# Patient Record
Sex: MALE | Race: WHITE | NOT HISPANIC OR LATINO
[De-identification: names, ages, dates, MRNs, and addresses within clinical notes are randomized per-mention and may not be internally consistent; named-entity substitution may affect disease eponyms.]

---

## 2021-09-23 DIAGNOSIS — Z01.818 ENCOUNTER FOR OTHER PREPROCEDURAL EXAMINATION: ICD-10-CM

## 2021-09-23 PROBLEM — Z00.00 ENCOUNTER FOR PREVENTIVE HEALTH EXAMINATION: Status: ACTIVE | Noted: 2021-09-23

## 2021-09-24 ENCOUNTER — APPOINTMENT (OUTPATIENT)
Dept: DISASTER EMERGENCY | Facility: CLINIC | Age: 41
End: 2021-09-24

## 2021-09-25 LAB — SARS-COV-2 N GENE NPH QL NAA+PROBE: NOT DETECTED

## 2022-08-31 ENCOUNTER — TRANSCRIPTION ENCOUNTER (OUTPATIENT)
Age: 42
End: 2022-08-31

## 2022-08-31 ENCOUNTER — EMERGENCY (EMERGENCY)
Facility: HOSPITAL | Age: 42
LOS: 1 days | Discharge: HOME | End: 2022-08-31
Admitting: STUDENT IN AN ORGANIZED HEALTH CARE EDUCATION/TRAINING PROGRAM
Payer: COMMERCIAL

## 2022-08-31 VITALS
OXYGEN SATURATION: 95 % | DIASTOLIC BLOOD PRESSURE: 88 MMHG | TEMPERATURE: 98 F | RESPIRATION RATE: 18 BRPM | HEART RATE: 70 BPM | HEIGHT: 69 IN | WEIGHT: 184.97 LBS | SYSTOLIC BLOOD PRESSURE: 132 MMHG

## 2022-08-31 PROCEDURE — 99283 EMERGENCY DEPT VISIT LOW MDM: CPT

## 2022-08-31 PROCEDURE — 99282 EMERGENCY DEPT VISIT SF MDM: CPT

## 2022-08-31 NOTE — ED PROVIDER NOTE - PHYSICAL EXAMINATION
CONSTITUTIONAL: Awake, alert.  Nontoxic, no acute distress.    HEAD: Normocephalic, atraumatic.    EYES: Conjunctivae clear without exudates or hemorrhage. Sclera is non-icteric.    ENT: Normal appearing external ears, nose, mucous membranes moist.  No lesions in mouth.    NECK: supple, trachea midline.    HEART:  Normal rate, regular rhythm.  Heart sounds S1, S2.  No murmurs, rubs or gallops.    LUNGS:  No acute respiratory distress.  Non-tachypneic and non-labored.  Lungs are clear bilaterally with good aeration.  No wheezing, rales, rhonchi.    ABDOMEN: No ttp    MUSCULOSKELETAL:  Moving all extremities without issue.    SKIN: +scattered pink, blanching/ urticarial rash to b/l lower ext, lower abd/lower back, and b/l upper inner arms     NEUROLOGICAL:  Patient is alert, oriented x person, place and time.    PSYCH: Appropriate mood and affect. Good judgment and insight.

## 2022-08-31 NOTE — ED PROVIDER NOTE - PATIENT PORTAL LINK FT
You can access the FollowMyHealth Patient Portal offered by [facility] by registering at the following website: [email or web address]. By joining [employer]’s FollowMyHealth portal, you will also be able to view your health information using other applications (apps) compatible with our system.

## 2022-08-31 NOTE — ED PROVIDER NOTE - NS ED ROS FT
CONSTITUTIONAL: Denies fever and chills    HEENT: Denies changes in vision and hearing.    RESPIRATORY: +cough.  Denies SOB    CARDIOVASCULAR: Denies palpitations and chest pain.    GASTROINTESTINAL: Denies abdominal pain, nausea, vomiting and diarrhea.    MUSCULOSKELETAL: Denies myalgia and joint pain.    SKIN: +pruritic rash    PSYCHIATRIC: Denies recent changes in mood. Denies anxiety and depression.

## 2022-08-31 NOTE — ED ADULT NURSE NOTE - CHIEF COMPLAINT QUOTE
rashes with itchiness started Saturday last week went to [facility] and prescribed steroids prednisone doesn't help

## 2022-08-31 NOTE — ED PROVIDER NOTE - CLINICAL SUMMARY MEDICAL DECISION MAKING FREE TEXT BOX
40 y/o otherwise healthy male p/w scattered urticarial rash to b/l lower ext, lower abd/ lower back and arms x 5 days, with preceding cough/congestion last week  No rash to mucous membranes or palms/soles  Suspect viral exanthem vs allergic dermatitis, doubt drug rash as started after amoxicillin  Do not suspect life threatening rash at this time.  Pt without sob/trouble breathing/speaking  Will advise pt to finish prescribed prednisone  Will advised benadryl and pepcid  Pt has f/u with allergist scheduled next week  Discussed strict return precautions

## 2022-08-31 NOTE — ED PROVIDER NOTE - NSFOLLOWUPINSTRUCTIONS_ED_ALL_ED_FT
Thank you for visiting [facility] Emergency Department.      We saw you today for a rash.    Please finish prednisone as prescribed.  Please start taking benadryl 25-50 mg every 6 hours as needed for itching/rash.  You may also take pepcid 20 mg every 12 hours as needed for itching/rash. The benadryl may make you sleepy.     Please know that no emergency visit is complete without follow-up with your primary care provider in 1 week.  Please bring copies of all discharge papers and results and show to your doctor.      Keep your appointment with allergist.  You may benefit from seeing a dermatologist is rash does not get better in next 1 week.    Please continue taking all previous medications as instructed unless we discussed otherwise.     I appreciated your patience and hope you feel better soon.     Return to ER immediately if you develop fevers, chills, chest pain, shortness of breath, worsening and/or any concerning symptoms.

## 2022-08-31 NOTE — ED ADULT NURSE NOTE - OBJECTIVE STATEMENT
Pt is 41 y.o male client complaining of rashes with itchiness started Saturday last week went to [facility] and prescribed steroids prednisone no relief. Denies cp, sob, fever, chills, dizziness, HA, tingling and numbness and weakness. Denies abd or urine sx. Will cont to monitor.

## 2022-08-31 NOTE — ED PROVIDER NOTE - OBJECTIVE STATEMENT
42 y/o male w/ no sig pmh p/w pruritic rash x 5 days.  Initially on b/l lower ext and abd/low back, then today noted on b/l arms.  Rash preceded last week by  chest congestion and cough productive of yellow/clear sputum.    States went to  when noticed the rash 5 days ago, was prescribed prednisone 20 BID, which he took 1 dose of 5 days ago, then missed doses the next day, then has taken over past 3 days.    Saw his PMD 3 days ago for chest congestion/cough, reports for prescribed amoxicillin which he has been taking (after rash developed)  States chest congestion/cough have improved.  Denies f/c, rash to mucous membranes/palms/soles, cp, sob, abd pain, n/v/d.    No recent new exposures - no new detergents, pets/animal exposures, travel, or being out in woods.  Was covid negative at recent pmd visit  Has not taken any antihistamines

## 2022-08-31 NOTE — ED ADULT TRIAGE NOTE - CHIEF COMPLAINT QUOTE
rashes with itchiness started Saturday last week went to [facility] and prescribed steroids prednisone doesn't help

## 2022-09-01 DIAGNOSIS — R21 RASH AND OTHER NONSPECIFIC SKIN ERUPTION: ICD-10-CM

## 2022-09-01 DIAGNOSIS — L50.9 URTICARIA, UNSPECIFIED: ICD-10-CM

## 2025-03-17 ENCOUNTER — APPOINTMENT (OUTPATIENT)
Age: 45
End: 2025-03-17
Payer: COMMERCIAL

## 2025-03-17 ENCOUNTER — NON-APPOINTMENT (OUTPATIENT)
Age: 45
End: 2025-03-17

## 2025-03-17 PROCEDURE — 92083 EXTENDED VISUAL FIELD XM: CPT

## 2025-03-17 PROCEDURE — 92202 OPSCPY EXTND ON/MAC DRAW: CPT

## 2025-03-17 PROCEDURE — 92004 COMPRE OPH EXAM NEW PT 1/>: CPT

## 2025-03-17 PROCEDURE — 92133 CPTRZD OPH DX IMG PST SGM ON: CPT

## 2025-09-18 ENCOUNTER — APPOINTMENT (OUTPATIENT)
Age: 45
End: 2025-09-18
Payer: COMMERCIAL

## 2025-09-18 ENCOUNTER — NON-APPOINTMENT (OUTPATIENT)
Age: 45
End: 2025-09-18

## 2025-09-18 PROCEDURE — 92012 INTRM OPH EXAM EST PATIENT: CPT

## 2025-09-18 PROCEDURE — 92133 CPTRZD OPH DX IMG PST SGM ON: CPT
